# Patient Record
Sex: FEMALE | Race: WHITE | NOT HISPANIC OR LATINO | Employment: FULL TIME | URBAN - METROPOLITAN AREA
[De-identification: names, ages, dates, MRNs, and addresses within clinical notes are randomized per-mention and may not be internally consistent; named-entity substitution may affect disease eponyms.]

---

## 2019-01-31 ENCOUNTER — OFFICE VISIT (OUTPATIENT)
Dept: ORTHOPEDIC SURGERY | Facility: CLINIC | Age: 49
End: 2019-01-31

## 2019-01-31 VITALS
HEART RATE: 80 BPM | HEIGHT: 60 IN | SYSTOLIC BLOOD PRESSURE: 118 MMHG | BODY MASS INDEX: 35.34 KG/M2 | WEIGHT: 180 LBS | DIASTOLIC BLOOD PRESSURE: 71 MMHG

## 2019-01-31 DIAGNOSIS — M75.101 TEAR OF RIGHT ROTATOR CUFF, UNSPECIFIED TEAR EXTENT: Primary | ICD-10-CM

## 2019-01-31 DIAGNOSIS — M75.102 TEAR OF LEFT ROTATOR CUFF, UNSPECIFIED TEAR EXTENT: ICD-10-CM

## 2019-01-31 PROCEDURE — 99203 OFFICE O/P NEW LOW 30 MIN: CPT | Performed by: ORTHOPAEDIC SURGERY

## 2019-01-31 RX ORDER — RANITIDINE HCL 75 MG
75 TABLET ORAL 2 TIMES DAILY
COMMUNITY

## 2019-01-31 RX ORDER — NAPROXEN 250 MG/1
250 TABLET ORAL 2 TIMES DAILY PRN
COMMUNITY
End: 2019-02-18 | Stop reason: ALTCHOICE

## 2019-01-31 NOTE — PROGRESS NOTES
Subjective: Bilateral shoulder pain     Patient ID: Chiquita Alfaro is a 48 y.o. female.    Chief Complaint:    History of Present Illness A 48-year-old female is seen by me today for the first time regarding bilateral shoulder pain that she has had since a work-related injury on 11/29/18.  She is a right-hand dominant female who was injured, she states, while lifting heavy boxes at work. While lifting a box that weighed according to the patient over 20 pounds or so, she felt a pop in both shoulders and intense pain and also pain in both wrists.  Prior to this injury, she denied any pain of any significance in either upper extremity.  She was seen and evaluated at this urgent care center with therapy and medication, and the wrist pain has resolved, but the shoulder pain persists.  She states she was prescribed a prescription dosage of anti-inflammatory and steroid medication, but that had to be mailed to her through her workmen's comp and was sent to the wrong address, so she has been taking just 1 Aleve twice a day.  She has been in physical therapy and has shown no improvement.  She describes significant pain in both shoulders, particularly when she first awakens in the morning.                      Social History     Occupational History   • Not on file   Tobacco Use   • Smoking status: Current Every Day Smoker     Packs/day: 0.50     Years: 22.00     Pack years: 11.00     Types: Cigarettes   • Smokeless tobacco: Never Used   Substance and Sexual Activity   • Alcohol use: No     Frequency: Never   • Drug use: No   • Sexual activity: Defer      Review of Systems   Constitutional: Negative for chills, diaphoresis, fever and unexpected weight change.   HENT: Negative for hearing loss, nosebleeds, sore throat and tinnitus.    Eyes: Negative for pain and visual disturbance.   Respiratory: Negative for cough, shortness of breath and wheezing.    Cardiovascular: Negative for chest pain and palpitations.   Gastrointestinal:  Negative for abdominal pain, diarrhea, nausea and vomiting.   Endocrine: Negative for cold intolerance, heat intolerance and polydipsia.   Genitourinary: Negative for difficulty urinating, dysuria and hematuria.   Musculoskeletal: Positive for joint swelling and myalgias. Negative for arthralgias.   Skin: Negative for rash and wound.   Allergic/Immunologic: Positive for environmental allergies.   Neurological: Negative for dizziness, syncope and numbness.   Hematological: Does not bruise/bleed easily.   Psychiatric/Behavioral: Negative for dysphoric mood and sleep disturbance. The patient is not nervous/anxious.          No past medical history on file.  No past surgical history on file.  Family History   Problem Relation Age of Onset   • Diabetes Mother    • Diabetes Maternal Aunt    • Diabetes Paternal Aunt    • Diabetes Maternal Grandmother    • Diabetes Paternal Grandmother          Objective:  Vitals:    01/31/19 0959   BP: 118/71   Pulse: 80         01/31/19  0959   Weight: 81.6 kg (180 lb)     Body mass index is 35.15 kg/m².        Ortho Exam   Review of both x-rays done here, AP and oblique, showed no acute or chronic changes. No prior studies are available for comparison. She is alert and oriented x3. Head is normocephalic and sclerae are clear. Left upper extremity shows no motor or sensory deficit as far as radial, ulnar and median nerve function. She lacks probably 5° of extension but has full flexion and pronation and supination of the elbow. She can actively extend the left shoulder only about 30° and abduct it about 35°. Passively can barely get it to 90° without severe pain and cannot hold it 90° against gravity. External rotation is approximately 30° and internal rotation is less than 10°. Markedly positive Galvan sign. She has pain on O’Carl test. Unable to perform Scotts test secondary to pain. Right upper extremity shows no motor deficit or sensory deficit as far as radial, ulnar and median  function. She has range of motion of the elbow as far as flexion, extension, pronation and supination. She can extend the right shoulder approximately 60° and abduct the right shoulder to almost 80° to 85°. She can passively abduct the right shoulder to about 150° and extend it to about 130°. She can hold the right shoulder at 90° and hold it against resistance but there is moderate pain. External rotation of the right shoulder is 45° and internal rotation is approximately 25°. There is no swelling noted. The skin is cool to touch. She is tolerating the 250 mg of ibuprofen twice a day with no GI side effect.         Assessment:        1. Tear of right rotator cuff, unspecified tear extent    2. Tear of left rotator cuff, unspecified tear extent           Plan:Over 20 minutes were spent with the patient face to face reviewing x-ray findings, physical findings, treatment rendered to date and outlining treatment plan going forward. Because of the severe pain in both shoulders and the mechanism of injuries I want to get MR arthrograms of both shoulders to rule out labral tear and/or rotator cuff tear. Physical therapy and antiinflammatories have been the treatment to date. If the MRIs are negative for tears, I will proceed with cortisone injection. Return to see me after the MR arthrograms of both shoulders are completed and continue light duty until seen.                  Work Status:    YOVANA query complete.    Orders:  Orders Placed This Encounter   Procedures   • MRI Shoulder Left Arthrogram   • MRI Shoulder Right Arthrogram       Medications:  No orders of the defined types were placed in this encounter.      Followup:  Return in about 4 weeks (around 2/28/2019).          Dictated utilizing Dragon dictation

## 2019-02-12 ENCOUNTER — APPOINTMENT (OUTPATIENT)
Dept: MRI IMAGING | Facility: HOSPITAL | Age: 49
End: 2019-02-12

## 2019-02-13 ENCOUNTER — HOSPITAL ENCOUNTER (OUTPATIENT)
Dept: MRI IMAGING | Facility: HOSPITAL | Age: 49
Discharge: HOME | End: 2019-02-13

## 2019-02-13 ENCOUNTER — HOSPITAL ENCOUNTER (OUTPATIENT)
Dept: GENERAL RADIOLOGY | Facility: HOSPITAL | Age: 49
Discharge: HOME | End: 2019-02-13

## 2019-02-13 ENCOUNTER — HOSPITAL ENCOUNTER (OUTPATIENT)
Dept: GENERAL RADIOLOGY | Facility: HOSPITAL | Age: 49
Discharge: HOME | End: 2019-02-13
Admitting: ORTHOPAEDIC SURGERY

## 2019-02-13 DIAGNOSIS — M75.102 TEAR OF LEFT ROTATOR CUFF, UNSPECIFIED TEAR EXTENT: ICD-10-CM

## 2019-02-13 DIAGNOSIS — M75.101 TEAR OF RIGHT ROTATOR CUFF, UNSPECIFIED TEAR EXTENT: ICD-10-CM

## 2019-02-13 PROCEDURE — 0 GADOBENATE DIMEGLUMINE 529 MG/ML SOLUTION: Performed by: ORTHOPAEDIC SURGERY

## 2019-02-13 PROCEDURE — 25010000002 IOPAMIDOL 61 % SOLUTION: Performed by: ORTHOPAEDIC SURGERY

## 2019-02-13 PROCEDURE — 73222 MRI JOINT UPR EXTREM W/DYE: CPT

## 2019-02-13 PROCEDURE — A9577 INJ MULTIHANCE: HCPCS | Performed by: ORTHOPAEDIC SURGERY

## 2019-02-13 PROCEDURE — 73040 CONTRAST X-RAY OF SHOULDER: CPT

## 2019-02-13 RX ORDER — LIDOCAINE HYDROCHLORIDE 10 MG/ML
5 INJECTION, SOLUTION INFILTRATION; PERINEURAL
Status: COMPLETED | OUTPATIENT
Start: 2019-02-13 | End: 2019-02-13

## 2019-02-13 RX ADMIN — GADOBENATE DIMEGLUMINE 1 ML: 529 INJECTION, SOLUTION INTRAVENOUS at 14:30

## 2019-02-13 RX ADMIN — GADOBENATE DIMEGLUMINE 1 ML: 529 INJECTION, SOLUTION INTRAVENOUS at 13:10

## 2019-02-13 RX ADMIN — IOPAMIDOL 12 ML: 612 INJECTION, SOLUTION INTRAVENOUS at 14:30

## 2019-02-13 RX ADMIN — IOPAMIDOL 12 ML: 612 INJECTION, SOLUTION INTRAVENOUS at 12:56

## 2019-02-13 RX ADMIN — LIDOCAINE HYDROCHLORIDE 5 ML: 10 INJECTION, SOLUTION INFILTRATION; PERINEURAL at 14:30

## 2019-02-13 RX ADMIN — LIDOCAINE HYDROCHLORIDE 5 ML: 10 INJECTION, SOLUTION INFILTRATION; PERINEURAL at 12:35

## 2019-02-14 ENCOUNTER — TELEPHONE (OUTPATIENT)
Dept: ORTHOPEDIC SURGERY | Facility: CLINIC | Age: 49
End: 2019-02-14

## 2019-02-14 ENCOUNTER — TRANSCRIBE ORDERS (OUTPATIENT)
Dept: ORTHOPEDIC SURGERY | Facility: CLINIC | Age: 49
End: 2019-02-14

## 2019-02-14 DIAGNOSIS — M25.519 SHOULDER PAIN, UNSPECIFIED CHRONICITY, UNSPECIFIED LATERALITY: Primary | ICD-10-CM

## 2019-02-15 ENCOUNTER — HOSPITAL ENCOUNTER (OUTPATIENT)
Dept: MRI IMAGING | Facility: HOSPITAL | Age: 49
Discharge: HOME | End: 2019-02-15

## 2019-02-15 DIAGNOSIS — M25.519 SHOULDER PAIN, UNSPECIFIED CHRONICITY, UNSPECIFIED LATERALITY: ICD-10-CM

## 2019-02-18 ENCOUNTER — OFFICE VISIT (OUTPATIENT)
Dept: ORTHOPEDIC SURGERY | Facility: CLINIC | Age: 49
End: 2019-02-18

## 2019-02-18 ENCOUNTER — TELEPHONE (OUTPATIENT)
Dept: ORTHOPEDIC SURGERY | Facility: CLINIC | Age: 49
End: 2019-02-18

## 2019-02-18 VITALS — HEIGHT: 60 IN | WEIGHT: 180 LBS | BODY MASS INDEX: 35.34 KG/M2

## 2019-02-18 DIAGNOSIS — M75.82 TENDINITIS OF LEFT ROTATOR CUFF: ICD-10-CM

## 2019-02-18 DIAGNOSIS — M75.50 SUBACROMIAL BURSITIS: Primary | ICD-10-CM

## 2019-02-18 DIAGNOSIS — M75.81 TENDINITIS OF RIGHT ROTATOR CUFF: ICD-10-CM

## 2019-02-18 PROCEDURE — 99213 OFFICE O/P EST LOW 20 MIN: CPT | Performed by: ORTHOPAEDIC SURGERY

## 2019-02-18 NOTE — PROGRESS NOTES
Subjective: Bilateral shoulder pain     Patient ID: Chiquita Alfaro is a 48 y.o. female.    Chief Complaint:    History of Present Illness patient returns with results of the MRI of both shoulders personally reviewed the images and the report.  Shows tendinitis of both shoulders subacromial bursitis but no complete tears.  Patient continues to experience moderate pain.       Social History     Occupational History   • Not on file   Tobacco Use   • Smoking status: Current Every Day Smoker     Packs/day: 0.50     Years: 22.00     Pack years: 11.00     Types: Cigarettes   • Smokeless tobacco: Never Used   Substance and Sexual Activity   • Alcohol use: No     Frequency: Never   • Drug use: No   • Sexual activity: Defer      Review of Systems   Constitutional: Negative for chills, diaphoresis, fever and unexpected weight change.   HENT: Negative for hearing loss, nosebleeds, sore throat and tinnitus.    Eyes: Negative for pain and visual disturbance.   Respiratory: Negative for cough, shortness of breath and wheezing.    Cardiovascular: Negative for chest pain and palpitations.   Gastrointestinal: Negative for abdominal pain, diarrhea, nausea and vomiting.   Endocrine: Negative for cold intolerance, heat intolerance and polydipsia.   Genitourinary: Negative for difficulty urinating, dysuria and hematuria.   Musculoskeletal: Positive for myalgias. Negative for arthralgias and joint swelling.   Skin: Negative for rash and wound.   Allergic/Immunologic: Negative for environmental allergies.   Neurological: Negative for dizziness, syncope and numbness.   Hematological: Does not bruise/bleed easily.   Psychiatric/Behavioral: Negative for dysphoric mood and sleep disturbance. The patient is not nervous/anxious.          History reviewed. No pertinent past medical history.  History reviewed. No pertinent surgical history.  Family History   Problem Relation Age of Onset   • Diabetes Mother    • Diabetes Maternal Aunt    • Diabetes  Paternal Aunt    • Diabetes Maternal Grandmother    • Diabetes Paternal Grandmother          Objective:  There were no vitals filed for this visit.      02/18/19  0815   Weight: 81.6 kg (180 lb)     Body mass index is 35.15 kg/m².        Ortho Exam   She is alert and oriented x3.  Again the left shoulder can be actively extended about 30 degrees and abducted 35 to possibly 40 degrees passively can only get to 90 degrees past which there is moderate to severe pain.  External rotation is 30 degrees internal rotation is 20 degrees the right shoulder shows active leg extension is 60 degrees and abduction 90 degrees.  Passively get her past 150 degrees.  External rotation is 45 degrees internal rotation is 25 degrees.  No motor or sensory deficit in either upper extremity shows good capillary refill.  Full range of motion of both elbows are flexion extension pronation supination.  Is unable to take the naproxen.  She is in moderate to severe distress she states because of the pain and discomfort.    Assessment:        1. Subacromial bursitis    2. Tendinitis of left rotator cuff    3. Tendinitis of right rotator cuff           Plan: Over 10 minutes was spent with the patient face-to-face reviewing her physical exam and her MRI findings.  She states she is tried physical therapy before her that with her range of motion exercises.  Placed on Voltaren gel as far as an anti-inflammatory get involved in physical therapy once again but have been performing ultrasound in addition to the range of motion exercises.  As far as work restrictions is no repetitive return to see me in 4 weeks overhead activity with either extremity no lifting and carrying pulling or pushing            Work Status:    YOVANA query complete.    Orders:  Orders Placed This Encounter   Procedures   • Ambulatory Referral to Physical Therapy Evaluate and treat       Medications:  New Medications Ordered This Visit   Medications   • diclofenac (VOLTAREN) 1 %  gel gel     Sig: Apply 4 g topically to the appropriate area as directed 4 (Four) Times a Day. Apply to both shoulders 4 times a day     Dispense:  100 g     Refill:  5       Followup:  Return in about 4 weeks (around 3/18/2019).          Dictated utilizing Dragon dictation

## 2019-02-19 ENCOUNTER — TELEPHONE (OUTPATIENT)
Dept: ORTHOPEDIC SURGERY | Facility: CLINIC | Age: 49
End: 2019-02-19

## 2019-02-19 RX ORDER — METHYLPREDNISOLONE 4 MG/1
TABLET ORAL
Qty: 21 TABLET | Refills: 0 | Status: SHIPPED | OUTPATIENT
Start: 2019-02-19 | End: 2019-03-14

## 2019-02-19 NOTE — TELEPHONE ENCOUNTER
Patient calling stating that she was under the impression you were going to order a oral steroid for her to take.    Last visit 02.18.2019.    DX of:   1. Subacromial bursitis    2. Tendinitis of left rotator cuff    3. Tendinitis of right rotator cuff      Thanks.

## 2019-02-28 ENCOUNTER — TELEPHONE (OUTPATIENT)
Dept: ORTHOPEDIC SURGERY | Facility: CLINIC | Age: 49
End: 2019-02-28

## 2019-02-28 NOTE — TELEPHONE ENCOUNTER
Patient called requesting 02/13-02/18 office off work notes to give to her employer because work comp wasn't forwarding them, I am emailing her those. She said she was released on light duty sit down work only.  Patient said she was sitting for 10 hours per day and employer was docking her when she got up for a minute to break from sitting.  She said they do not have any work. She ask if she could be taking off until her next visitt 03/14/19.  I spoke with Dr. Muhammad and he cannot take her off work when light duty is offered.  If she isnt being treated fairly by her employer, she should contact  or Union rep.  Same restriction do apply until she is seen 03/14.  Patient was contacted and was in agreement.

## 2019-03-14 ENCOUNTER — OFFICE VISIT (OUTPATIENT)
Dept: ORTHOPEDIC SURGERY | Facility: CLINIC | Age: 49
End: 2019-03-14

## 2019-03-14 VITALS — HEIGHT: 60 IN | WEIGHT: 180 LBS | BODY MASS INDEX: 35.34 KG/M2

## 2019-03-14 DIAGNOSIS — M75.50 SUBACROMIAL BURSITIS: Primary | ICD-10-CM

## 2019-03-14 DIAGNOSIS — M75.81 TENDINITIS OF RIGHT ROTATOR CUFF: ICD-10-CM

## 2019-03-14 DIAGNOSIS — M75.82 TENDINITIS OF LEFT ROTATOR CUFF: ICD-10-CM

## 2019-03-14 PROCEDURE — 99213 OFFICE O/P EST LOW 20 MIN: CPT | Performed by: ORTHOPAEDIC SURGERY

## 2019-03-14 NOTE — PROGRESS NOTES
Subjective: Bilateral shoulder pain     Patient ID: Chiquita Alfaro is a 48 y.o. female.    Chief Complaint:    History of Present Illness patient seen follow-up to the tendinitis of both shoulders.  Apparently she has not been in physical therapy as of yet is just been authorized by Workmen's Comp. but has been using the Voltaren gel is noted moderate improvement in the right shoulder but minimal on the left.       Social History     Occupational History   • Not on file   Tobacco Use   • Smoking status: Current Every Day Smoker     Packs/day: 0.50     Years: 22.00     Pack years: 11.00     Types: Cigarettes   • Smokeless tobacco: Never Used   Substance and Sexual Activity   • Alcohol use: No     Frequency: Never   • Drug use: No   • Sexual activity: Defer      Review of Systems   Constitutional: Negative for chills, diaphoresis, fever and unexpected weight change.   HENT: Negative for hearing loss, nosebleeds, sore throat and tinnitus.    Eyes: Negative for pain and visual disturbance.   Respiratory: Negative for cough, shortness of breath and wheezing.    Cardiovascular: Negative for chest pain and palpitations.   Gastrointestinal: Negative for abdominal pain, diarrhea, nausea and vomiting.   Endocrine: Negative for cold intolerance, heat intolerance and polydipsia.   Genitourinary: Negative for difficulty urinating, dysuria and hematuria.   Musculoskeletal: Positive for myalgias. Negative for arthralgias and joint swelling.   Skin: Negative for rash and wound.   Allergic/Immunologic: Negative for environmental allergies.   Neurological: Negative for dizziness, syncope and numbness.   Hematological: Does not bruise/bleed easily.   Psychiatric/Behavioral: Negative for dysphoric mood and sleep disturbance. The patient is not nervous/anxious.          History reviewed. No pertinent past medical history.  History reviewed. No pertinent surgical history.  Family History   Problem Relation Age of Onset   • Diabetes Mother     • Diabetes Maternal Aunt    • Diabetes Paternal Aunt    • Diabetes Maternal Grandmother    • Diabetes Paternal Grandmother          Objective:  There were no vitals filed for this visit.      03/14/19  0833   Weight: 81.6 kg (180 lb)     Body mass index is 35.15 kg/m².        Ortho Exam   She is alert and oriented x3.  She can extend and abduct the right shoulder approximately 160-170 degrees.  Left shoulder she can extend only about 45-50 degrees and abduct the same.  External rotation is 45 degrees on the right side about 35 in the left internal rotation is 30 degrees on the right and 15 on the left.  Biceps function 5/5.  Still pain with abduction extension at 90 degrees in the right shoulder although just minimal.  Left shoulder abduction extension against resistance is moderate.  Distal neurovascular function is large radial ulnar median nerve function is intact.  There is no sensory loss.  Complaining of stenosing tenosynovitis involving the long finger of the right hand but this is not related I think to their work-related injury.  Tolerating the Voltaren gel.    Assessment:        1. Subacromial bursitis    2. Tendinitis of left rotator cuff    3. Tendinitis of right rotator cuff           Plan: Over 10 minutes was spent with the patient face-to-face reviewing her physical exam and outlining treatment plan going forward.  She is to call physical therapy today apparently it is been improved no longer seen 3 times a week for the next 4 weeks continue the same work restrictions until seen back in 4 weeks.            Work Status:    YOVANA query complete.    Orders:  No orders of the defined types were placed in this encounter.      Medications:  No orders of the defined types were placed in this encounter.      Followup:  Return in about 4 weeks (around 4/11/2019).          Dictated utilizing Dragon dictation

## 2019-03-26 DIAGNOSIS — M75.50 BURSITIS OF SHOULDER, UNSPECIFIED LATERALITY: Primary | ICD-10-CM

## 2019-04-11 ENCOUNTER — OFFICE VISIT (OUTPATIENT)
Dept: ORTHOPEDIC SURGERY | Facility: CLINIC | Age: 49
End: 2019-04-11

## 2019-04-11 DIAGNOSIS — M75.82 TENDINITIS OF LEFT ROTATOR CUFF: ICD-10-CM

## 2019-04-11 DIAGNOSIS — M75.81 TENDINITIS OF RIGHT ROTATOR CUFF: ICD-10-CM

## 2019-04-11 DIAGNOSIS — M75.50 SUBACROMIAL BURSITIS: ICD-10-CM

## 2019-04-11 DIAGNOSIS — M75.50 BURSITIS OF SHOULDER, UNSPECIFIED LATERALITY: Primary | ICD-10-CM

## 2019-04-11 PROCEDURE — 99213 OFFICE O/P EST LOW 20 MIN: CPT | Performed by: ORTHOPAEDIC SURGERY

## 2019-04-11 PROCEDURE — 20610 DRAIN/INJ JOINT/BURSA W/O US: CPT | Performed by: ORTHOPAEDIC SURGERY

## 2019-04-11 RX ORDER — LIDOCAINE HYDROCHLORIDE 10 MG/ML
4 INJECTION, SOLUTION EPIDURAL; INFILTRATION; INTRACAUDAL; PERINEURAL
Status: COMPLETED | OUTPATIENT
Start: 2019-04-11 | End: 2019-04-11

## 2019-04-11 RX ORDER — TRIAMCINOLONE ACETONIDE 40 MG/ML
40 INJECTION, SUSPENSION INTRA-ARTICULAR; INTRAMUSCULAR
Status: COMPLETED | OUTPATIENT
Start: 2019-04-11 | End: 2019-04-11

## 2019-04-11 RX ADMIN — LIDOCAINE HYDROCHLORIDE 4 ML: 10 INJECTION, SOLUTION EPIDURAL; INFILTRATION; INTRACAUDAL; PERINEURAL at 08:40

## 2019-04-11 RX ADMIN — TRIAMCINOLONE ACETONIDE 40 MG: 40 INJECTION, SUSPENSION INTRA-ARTICULAR; INTRAMUSCULAR at 08:40

## 2019-04-11 NOTE — PROGRESS NOTES
Subjective: Bilateral shoulder pain     Patient ID: Chiquita Alfaro is a 48 y.o. female.    Chief Complaint:    History of Present Illness 48-year-old female is seen in follow-up to the bilateral shoulder pain rotator cuff tendinitis and bursitis.  Is been in physical therapy for 3 weeks.  States the right shoulder is doing significantly better but still having pain in the left shoulder.  Has been using the Voltaren gel.       Social History     Occupational History   • Not on file   Tobacco Use   • Smoking status: Current Every Day Smoker     Packs/day: 0.50     Years: 22.00     Pack years: 11.00     Types: Cigarettes   • Smokeless tobacco: Never Used   Substance and Sexual Activity   • Alcohol use: No     Frequency: Never   • Drug use: No   • Sexual activity: Defer      Review of Systems   Constitutional: Negative for chills, diaphoresis, fever and unexpected weight change.   HENT: Negative for hearing loss, nosebleeds, sore throat and tinnitus.    Eyes: Negative for pain and visual disturbance.   Respiratory: Negative for cough, shortness of breath and wheezing.    Cardiovascular: Negative for chest pain and palpitations.   Gastrointestinal: Negative for abdominal pain, diarrhea, nausea and vomiting.   Endocrine: Negative for cold intolerance, heat intolerance and polydipsia.   Genitourinary: Negative for difficulty urinating, dysuria and hematuria.   Musculoskeletal: Negative for arthralgias, joint swelling and myalgias.   Skin: Negative for rash and wound.   Allergic/Immunologic: Negative for environmental allergies.   Neurological: Negative for dizziness, syncope and numbness.   Hematological: Does not bruise/bleed easily.   Psychiatric/Behavioral: Negative for dysphoric mood and sleep disturbance. The patient is not nervous/anxious.          No past medical history on file.  No past surgical history on file.  Family History   Problem Relation Age of Onset   • Diabetes Mother    • Diabetes Maternal Aunt    •  Diabetes Paternal Aunt    • Diabetes Maternal Grandmother    • Diabetes Paternal Grandmother          Objective:  There were no vitals filed for this visit.  There were no vitals filed for this visit.  There is no height or weight on file to calculate BMI.        Ortho Exam   She is alert and oriented x3.  In the right shoulder she has no motor or sensory deficit in the right upper extremity.  She has 180 degrees of abduction and 180 degrees of asked abduction extension against resistance causes minimal pain.  Biceps function 5/5.  Full range of motion of the elbow.  External rotation is 45 degrees and internal rotation is 40 degrees.  Left shoulder she can extend about 120 degrees and abduct about 110 degrees.  External rotation is 40-45 degrees internal rotation is probably 20-30 degrees.  Abduction extension against resistance is intact although still painful.  Biceps function is 5/5.  No motor or sensory deficit in the left upper extremity good distal pulses.  Full range of motion of the elbow.    Assessment:        1. Bursitis of shoulder, unspecified laterality    2. Subacromial bursitis           Plan: Over 10 minutes was spent face-to-face with the patient reviewing history medications physical therapy outlining treatment plan going forward.  I am going to inject the left shoulder with lidocaine and Kenalog and try to get her through the inflammation and help physical therapy more effective.  She is to continue therapy for 3 more weeks and continue light duty for that 3-week period.  At that time she is released to regular duty.  Return to see me in 4 weeks for follow-up continue the Voltaren gel  Large Joint Arthrocentesis: L subacromial bursa  Date/Time: 4/11/2019 8:40 AM  Consent given by: patient  Site marked: site marked  Timeout: Immediately prior to procedure a time out was called to verify the correct patient, procedure, equipment, support staff and site/side marked as required   Supporting  Documentation  Indications: pain   Procedure Details  Location: shoulder - L subacromial bursa  Preparation: Patient was prepped and draped in the usual sterile fashion  Needle size: 22 G  Approach: posterior  Medications administered: 4 mL lidocaine PF 1% 1 %; 40 mg triamcinolone acetonide 40 MG/ML  Patient tolerance: patient tolerated the procedure well with no immediate complications                  Work Status:    YOVANA query complete.    Orders:  Orders Placed This Encounter   Procedures   • Large Joint Arthrocentesis: L subacromial bursa       Medications:  No orders of the defined types were placed in this encounter.      Followup:  Return in about 4 weeks (around 5/9/2019).          Dictated utilizing Dragon dictation

## 2019-05-03 ENCOUNTER — TELEPHONE (OUTPATIENT)
Dept: ORTHOPEDIC SURGERY | Facility: CLINIC | Age: 49
End: 2019-05-03

## 2019-05-03 NOTE — TELEPHONE ENCOUNTER
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    Please advise.

## 2019-05-06 ENCOUNTER — OFFICE VISIT (OUTPATIENT)
Dept: ORTHOPEDIC SURGERY | Facility: CLINIC | Age: 49
End: 2019-05-06

## 2019-05-06 ENCOUNTER — TELEPHONE (OUTPATIENT)
Dept: ORTHOPEDIC SURGERY | Facility: CLINIC | Age: 49
End: 2019-05-06

## 2019-05-06 VITALS — HEIGHT: 60 IN | BODY MASS INDEX: 35.34 KG/M2 | WEIGHT: 180 LBS

## 2019-05-06 DIAGNOSIS — M75.50 BURSITIS OF SHOULDER, UNSPECIFIED LATERALITY: Primary | ICD-10-CM

## 2019-05-06 DIAGNOSIS — M75.50 SUBACROMIAL BURSITIS: ICD-10-CM

## 2019-05-06 DIAGNOSIS — M75.101 TEAR OF RIGHT ROTATOR CUFF, UNSPECIFIED TEAR EXTENT: ICD-10-CM

## 2019-05-06 DIAGNOSIS — M75.102 TEAR OF LEFT ROTATOR CUFF, UNSPECIFIED TEAR EXTENT: ICD-10-CM

## 2019-05-06 PROCEDURE — 99213 OFFICE O/P EST LOW 20 MIN: CPT | Performed by: ORTHOPAEDIC SURGERY

## 2019-05-06 NOTE — PROGRESS NOTES
Subjective: Bilateral shoulder pain     Patient ID: Chiquita Alfaro is a 48 y.o. female.    Chief Complaint:    History of Present Illness 48-year-old female returns for which she returned to regular duty she started having significant pain discomfort in both shoulders.  Again the MRI did show partial tears of both rotator cuff.  States she is doing better with therapy until she returned to regular duty with her last therapy visit was over a week ago.  The left shoulder is much more symptomatic than the right       Social History     Occupational History   • Not on file   Tobacco Use   • Smoking status: Current Every Day Smoker     Packs/day: 0.50     Years: 22.00     Pack years: 11.00     Types: Cigarettes   • Smokeless tobacco: Never Used   Substance and Sexual Activity   • Alcohol use: No     Frequency: Never   • Drug use: No   • Sexual activity: Defer      Review of Systems   Constitutional: Negative for chills, diaphoresis, fever and unexpected weight change.   HENT: Negative for hearing loss, nosebleeds, sore throat and tinnitus.    Eyes: Negative for pain and visual disturbance.   Respiratory: Negative for cough, shortness of breath and wheezing.    Cardiovascular: Negative for chest pain and palpitations.   Gastrointestinal: Negative for abdominal pain, diarrhea, nausea and vomiting.   Endocrine: Negative for cold intolerance, heat intolerance and polydipsia.   Genitourinary: Negative for difficulty urinating, dysuria and hematuria.   Musculoskeletal: Positive for myalgias. Negative for arthralgias and joint swelling.   Skin: Negative for rash and wound.   Allergic/Immunologic: Negative for environmental allergies.   Neurological: Negative for dizziness, syncope and numbness.   Hematological: Does not bruise/bleed easily.   Psychiatric/Behavioral: Negative for dysphoric mood and sleep disturbance. The patient is not nervous/anxious.          History reviewed. No pertinent past medical history.  History  reviewed. No pertinent surgical history.  Family History   Problem Relation Age of Onset   • Diabetes Mother    • Diabetes Maternal Aunt    • Diabetes Paternal Aunt    • Diabetes Maternal Grandmother    • Diabetes Paternal Grandmother          Objective:  There were no vitals filed for this visit.      05/06/19  1106   Weight: 81.6 kg (180 lb)     Body mass index is 35.15 kg/m².        Ortho Exam   She is alert and oriented x3.  She can extend the right shoulder approximately 160 degrees with the left shoulder only about 90 degrees and abduct the right shoulder approximately 160 degrees but left shoulder only about 80 degrees.  External rotation is 45 degrees in both shoulders internal rotation is less than 30 degrees.  Positive Galvan sign in both shoulders much more positive on the left than the right.  Deltoid function is 5/5 in the right 3 out of 5 in the left.  Biceps function is 5 out of 5 in both shoulders.  She has good capillary refill.  No motor or sensory deficit in either upper extremity skin is cool to touch    Assessment:        1. Bursitis of shoulder, unspecified laterality    2. Subacromial bursitis    3. Tear of right rotator cuff, unspecified tear extent    4. Tear of left rotator cuff, unspecified tear extent           Plan: More therapy has been authorized 3 times a week for the next 4 weeks.  I will put her back on restricted duty.  Return in 4 weeks if she is not ready to return to regular duty and when asked Dr. Pickett to evaluate the need for further treatment recommendations            Work Status:    YOVANA query complete.    Orders:  No orders of the defined types were placed in this encounter.      Medications:  No orders of the defined types were placed in this encounter.      Followup:  Return in about 4 weeks (around 6/3/2019).          Dictated utilizing Dragon dictation

## 2019-05-06 NOTE — TELEPHONE ENCOUNTER
Taisha with [facility] [phone] calling asking for today's office note and a new referral for PT on the patient to be faxed to [phone].      Plan: More therapy has been authorized 3 times a week for the next 4 weeks.  I will put her back on restricted duty.  Return in 4 weeks if she is not ready to return to regular duty and when asked Dr. Pickett to evaluate the need for further treatment recommendations.    DX:  1. Bursitis of shoulder, unspecified laterality    2. Subacromial bursitis    3. Tear of right rotator cuff, unspecified tear extent    4. Tear of left rotator cuff, unspecified tear extent          Thanks.

## 2019-05-13 DIAGNOSIS — M75.101 ROTATOR CUFF TEAR ARTHROPATHY OF BOTH SHOULDERS: Primary | ICD-10-CM

## 2019-05-13 DIAGNOSIS — M75.102 ROTATOR CUFF TEAR ARTHROPATHY OF BOTH SHOULDERS: Primary | ICD-10-CM

## 2019-05-13 DIAGNOSIS — M12.811 ROTATOR CUFF TEAR ARTHROPATHY OF BOTH SHOULDERS: Primary | ICD-10-CM

## 2019-05-13 DIAGNOSIS — M12.812 ROTATOR CUFF TEAR ARTHROPATHY OF BOTH SHOULDERS: Primary | ICD-10-CM

## 2019-06-06 ENCOUNTER — OFFICE VISIT (OUTPATIENT)
Dept: ORTHOPEDIC SURGERY | Facility: CLINIC | Age: 49
End: 2019-06-06

## 2019-06-06 VITALS — WEIGHT: 180 LBS | BODY MASS INDEX: 35.34 KG/M2 | HEIGHT: 60 IN

## 2019-06-06 DIAGNOSIS — M75.101 ROTATOR CUFF TEAR ARTHROPATHY OF BOTH SHOULDERS: Primary | ICD-10-CM

## 2019-06-06 DIAGNOSIS — M75.50 BURSITIS OF SHOULDER, UNSPECIFIED LATERALITY: ICD-10-CM

## 2019-06-06 DIAGNOSIS — M06.9 RHEUMATOID ARTHRITIS, INVOLVING UNSPECIFIED SITE, UNSPECIFIED RHEUMATOID FACTOR PRESENCE: ICD-10-CM

## 2019-06-06 DIAGNOSIS — M12.811 ROTATOR CUFF TEAR ARTHROPATHY OF BOTH SHOULDERS: Primary | ICD-10-CM

## 2019-06-06 DIAGNOSIS — M12.812 ROTATOR CUFF TEAR ARTHROPATHY OF BOTH SHOULDERS: Primary | ICD-10-CM

## 2019-06-06 DIAGNOSIS — M75.102 ROTATOR CUFF TEAR ARTHROPATHY OF BOTH SHOULDERS: Primary | ICD-10-CM

## 2019-06-06 PROCEDURE — 99212 OFFICE O/P EST SF 10 MIN: CPT | Performed by: ORTHOPAEDIC SURGERY

## 2019-06-06 RX ORDER — MELOXICAM 15 MG/1
15 TABLET ORAL DAILY
Refills: 2 | COMMUNITY
Start: 2019-05-28

## 2019-06-06 NOTE — PROGRESS NOTES
Subjective: Shoulder pain, generalized malaise     Patient ID: Chiquita Alfaro is a 48 y.o. female.    Chief Complaint:    History of Present Illness since last seen the patient states she has been diagnosed with rheumatoid arthritisby her primary care physician.  States she has an appointment with rheumatologist next week.  She has complaints of pain in all of her joints not just the shoulders although she has fairly good shoulder motion today.  She is been placed on medical leave because of the diagnosis of rheumatoid arthritis       Social History     Occupational History   • Not on file   Tobacco Use   • Smoking status: Current Every Day Smoker     Packs/day: 0.50     Years: 22.00     Pack years: 11.00     Types: Cigarettes   • Smokeless tobacco: Never Used   Substance and Sexual Activity   • Alcohol use: No     Frequency: Never   • Drug use: No   • Sexual activity: Defer      Review of Systems   Constitutional: Negative for chills, diaphoresis, fever and unexpected weight change.   HENT: Negative for hearing loss, nosebleeds, sore throat and tinnitus.    Eyes: Negative for pain and visual disturbance.   Respiratory: Negative for cough, shortness of breath and wheezing.    Cardiovascular: Negative for chest pain and palpitations.   Gastrointestinal: Negative for abdominal pain, diarrhea, nausea and vomiting.   Endocrine: Negative for cold intolerance, heat intolerance and polydipsia.   Genitourinary: Negative for difficulty urinating, dysuria and hematuria.   Musculoskeletal: Positive for joint swelling. Negative for arthralgias.   Skin: Negative for rash and wound.   Allergic/Immunologic: Negative for environmental allergies.   Neurological: Negative for dizziness, syncope and numbness.   Hematological: Does not bruise/bleed easily.   Psychiatric/Behavioral: Negative for dysphoric mood and sleep disturbance. The patient is not nervous/anxious.          Past Medical History:   Diagnosis Date   • Lupus    • RA  (rheumatoid arthritis) (CMS/[facility])      History reviewed. No pertinent surgical history.  Family History   Problem Relation Age of Onset   • Diabetes Mother    • Diabetes Maternal Aunt    • Diabetes Paternal Aunt    • Diabetes Maternal Grandmother    • Diabetes Paternal Grandmother          Objective:  There were no vitals filed for this visit.      06/06/19  0831   Weight: 81.6 kg (180 lb)     Body mass index is 35.15 kg/m².        Ortho Exam   Far as her shoulders are concerned again there is no motor or sensory deficit she can abduct and extend about 170 degrees there is still some pain with abduction extension and 90 degrees against resistance.  Is good capillary refill no motor or sensory deficit.  Still has a mildly positive Galvan sign in both shoulders.  External rotation is about 40 degrees internal rotation 35 degrees.  She is currently taking meloxicam    Assessment:        1. Rotator cuff tear arthropathy of both shoulders    2. Bursitis of shoulder, unspecified laterality    3. Rheumatoid arthritis, involving unspecified site, unspecified rheumatoid factor presence (CMS/[facility])           Plan: Number to defer all further treatment to the rheumatologist.  She is on medical leave told her when she was released by the rheumatologist and she still having significant shoulder pain she should return to see us otherwise all care and released to work            Work Status:    YOVANA query complete.    Orders:  No orders of the defined types were placed in this encounter.      Medications:  No orders of the defined types were placed in this encounter.      Followup:  Return if symptoms worsen or fail to improve.          Dictated utilizing Dragon dictation